# Patient Record
Sex: FEMALE | Race: BLACK OR AFRICAN AMERICAN | NOT HISPANIC OR LATINO | Employment: UNEMPLOYED | URBAN - NONMETROPOLITAN AREA
[De-identification: names, ages, dates, MRNs, and addresses within clinical notes are randomized per-mention and may not be internally consistent; named-entity substitution may affect disease eponyms.]

---

## 2017-06-09 DIAGNOSIS — Z01.818 PRE-OPERATIVE CLEARANCE: Primary | ICD-10-CM

## 2017-06-12 ENCOUNTER — HOSPITAL ENCOUNTER (OUTPATIENT)
Dept: GENERAL RADIOLOGY | Facility: HOSPITAL | Age: 47
Discharge: HOME | End: 2017-06-12
Attending: INTERNAL MEDICINE | Admitting: INTERNAL MEDICINE

## 2017-06-12 DIAGNOSIS — Z01.818 PRE-OPERATIVE CLEARANCE: ICD-10-CM

## 2017-06-12 PROCEDURE — 71020 XR CHEST PA AND LATERAL: CPT | Performed by: RADIOLOGY

## 2017-06-12 PROCEDURE — 71020 HC CHEST PA AND LATERAL: CPT

## 2017-06-14 ENCOUNTER — OFFICE VISIT (OUTPATIENT)
Dept: PULMONOLOGY | Facility: CLINIC | Age: 47
End: 2017-06-14

## 2017-06-14 VITALS
WEIGHT: 293 LBS | HEART RATE: 98 BPM | SYSTOLIC BLOOD PRESSURE: 130 MMHG | HEIGHT: 66 IN | BODY MASS INDEX: 47.09 KG/M2 | OXYGEN SATURATION: 95 % | DIASTOLIC BLOOD PRESSURE: 90 MMHG | TEMPERATURE: 97.9 F

## 2017-06-14 DIAGNOSIS — Z01.818 PRE-OPERATIVE CLEARANCE: Primary | ICD-10-CM

## 2017-06-14 LAB
FEV1: 2.22 LITERS
FVC VOL RESPIRATORY: 2.79 LITERS

## 2017-06-14 PROCEDURE — 94010 BREATHING CAPACITY TEST: CPT | Performed by: INTERNAL MEDICINE

## 2017-06-14 PROCEDURE — 99213 OFFICE O/P EST LOW 20 MIN: CPT | Performed by: INTERNAL MEDICINE

## 2017-06-14 RX ORDER — CYCLOBENZAPRINE HCL 10 MG
10 TABLET ORAL 2 TIMES DAILY
COMMUNITY
Start: 2017-03-16

## 2017-06-14 RX ORDER — LACTULOSE 10 G/15ML
10 SOLUTION ORAL 2 TIMES DAILY
COMMUNITY
Start: 2017-04-13

## 2017-06-14 RX ORDER — MAGNESIUM 200 MG
1000 TABLET ORAL DAILY
COMMUNITY

## 2017-06-14 RX ORDER — NYSTATIN 100000 [USP'U]/G
POWDER TOPICAL
COMMUNITY
Start: 2017-04-13

## 2017-06-14 RX ORDER — FLUTICASONE PROPIONATE 50 MCG
2 SPRAY, SUSPENSION (ML) NASAL DAILY
COMMUNITY
Start: 2017-04-13

## 2017-06-14 RX ORDER — POTASSIUM CHLORIDE 3 G/15ML
40 SOLUTION ORAL DAILY
COMMUNITY

## 2017-06-14 RX ORDER — GABAPENTIN 300 MG/1
300 CAPSULE ORAL 3 TIMES DAILY
COMMUNITY
Start: 2017-04-13

## 2017-06-14 RX ORDER — FUROSEMIDE 10 MG/ML
10 SOLUTION ORAL DAILY
COMMUNITY
Start: 2017-04-13

## 2017-06-14 RX ORDER — SUCRALFATE ORAL 1 G/10ML
500 SUSPENSION ORAL 2 TIMES DAILY
COMMUNITY

## 2017-06-14 RX ORDER — HYDROCODONE BITARTRATE AND ACETAMINOPHEN 7.5; 325 MG/1; MG/1
1 TABLET ORAL EVERY 8 HOURS PRN
COMMUNITY
Start: 2017-03-27

## 2017-06-14 ASSESSMENT — PULMONARY FUNCTION TESTS
FEV1: 2.22
FVC: 2.79

## 2017-06-14 NOTE — PROGRESS NOTES
History of Present Illness [age]-year-old female referred for preop clearance to to have a gastric bypass surgery.  She has been overweight all her adult life and I actually had a LAP-BAND operation about 2 years ago that was not effective.  She Also has arthritis and hypertension and past history significant for gallbladder surgery and hysterectomy September 2016.  Nonsmoker with no asthma      Review of Systems no chest related problems and review of systems noncontributory other than arthritis and hypertension being followed by Family physician    Active Problems:  Problem List Items Addressed This Visit        Other    Pre-operative clearance - Primary    Relevant Orders    Pulmonary Function Test (Completed)          Past Medical History:  Past Medical History:   Diagnosis Date   • Arthritis    • Degenerative disc disease, lumbar    • Scoliosis        Family History:  History reviewed. No pertinent family history.    Social History:  Social History   Substance Use Topics   • Smoking status: Never Smoker   • Smokeless tobacco: Never Used   • Alcohol use No       Current Medications:  Current Outpatient Prescriptions   Medication Sig Dispense Refill   • cetirizine (zyrTEC) 1 MG/ML syrup Take 15 mg by mouth 2 (Two) Times a Day.     • Cholecalciferol (VITAMIN D) 1000 UNITS tablet Take 1,000 Units by mouth Daily.     • Cyanocobalamin (VITAMIN B-12) 1000 MCG sublingual tablet Place 1,000 mcg under the tongue Daily.     • cyclobenzaprine (FLEXERIL) 10 MG tablet Take 10 mg by mouth 2 (Two) Times a Day.     • fluticasone (FLONASE) 50 MCG/ACT nasal spray 2 sprays into each nostril Daily.     • furosemide (LASIX) 10 MG/ML solution Take 10 mg by mouth Daily.     • gabapentin (NEURONTIN) 300 MG capsule Take 300 mg by mouth 3 (Three) Times a Day.     • HYDROcodone-acetaminophen (NORCO) 7.5-325 MG per tablet Take 1 tablet by mouth Every 8 (Eight) Hours As Needed.     • lactulose (CHRONULAC) 10 GM/15ML solution Take 10 g by  "mouth 2 (Two) Times a Day.     • metoprolol tartrate (LOPRESSOR) 25 MG tablet Take 25 mg by mouth As Needed.     • nystatin (nystatin) 591963 UNIT/GM powder      • Potassium Chloride 40 MEQ/15ML (20%) solution Take 40 mEq by mouth Daily.     • sucralfate (CARAFATE) 1 GM/10ML suspension Take 500 mg by mouth 2 (Two) Times a Day.       No current facility-administered medications for this visit.        Allergies:  Allergies   Allergen Reactions   • Amoxicillin    • Erythromycin    • Levaquin [Levofloxacin]    • Naproxen    • Penicillins    • Quinolones        Vitals:  /90 (BP Location: Left arm, Patient Position: Sitting, Cuff Size: Adult)  Pulse 98  Temp 97.9 °F (36.6 °C) (Oral)   Ht 66\" (167.6 cm)  Wt (!) 328 lb (149 kg)  SpO2 95%  BMI 52.94 kg/m2    Physical Exam no acute distress history overweight weighing 320 pounds for height of 65 inches vital signs are stable lungs clear heart regular    Imaging: Chest x-ray Clear    PFT: Mild restrictive impairment with FVC of 75 FEV1 74%  Results for orders placed or performed in visit on 06/14/17   Pulmonary Function Test   Result Value Ref Range    FEV1 2.22 liters    FVC 2.79 liters           ASSESSMENT AND DIAGNOSIS:Clear from pulmonary standpoint to have a gastric bypass surgery        Follow-Up: Per Dr. Chidi Chambers of [facility] and Dr. Benz the surgeon in [place]                    "